# Patient Record
Sex: FEMALE | Race: BLACK OR AFRICAN AMERICAN | NOT HISPANIC OR LATINO | URBAN - METROPOLITAN AREA
[De-identification: names, ages, dates, MRNs, and addresses within clinical notes are randomized per-mention and may not be internally consistent; named-entity substitution may affect disease eponyms.]

---

## 2019-11-07 ENCOUNTER — APPOINTMENT (EMERGENCY)
Dept: RADIOLOGY | Facility: HOSPITAL | Age: 19
End: 2019-11-07

## 2019-11-07 ENCOUNTER — HOSPITAL ENCOUNTER (EMERGENCY)
Facility: HOSPITAL | Age: 19
Discharge: HOME | End: 2019-11-07
Attending: EMERGENCY MEDICINE

## 2019-11-07 VITALS
DIASTOLIC BLOOD PRESSURE: 65 MMHG | OXYGEN SATURATION: 96 % | HEART RATE: 89 BPM | WEIGHT: 115.3 LBS | SYSTOLIC BLOOD PRESSURE: 127 MMHG | TEMPERATURE: 98.6 F | RESPIRATION RATE: 16 BRPM

## 2019-11-07 DIAGNOSIS — J45.901 ASTHMA EXACERBATION: Primary | ICD-10-CM

## 2019-11-07 PROCEDURE — 99283 EMERGENCY DEPT VISIT LOW MDM: CPT

## 2019-11-07 PROCEDURE — 71046 X-RAY EXAM CHEST 2 VIEWS: CPT

## 2019-11-07 PROCEDURE — 94640 AIRWAY INHALATION TREATMENT: CPT

## 2019-11-07 PROCEDURE — 99284 EMERGENCY DEPT VISIT MOD MDM: CPT | Performed by: PHYSICIAN ASSISTANT

## 2019-11-07 RX ORDER — PREDNISONE 20 MG/1
40 TABLET ORAL DAILY
Qty: 8 TABLET | Refills: 0 | Status: SHIPPED | OUTPATIENT
Start: 2019-11-08 | End: 2019-11-12

## 2019-11-07 RX ORDER — ALBUTEROL SULFATE 2.5 MG/3ML
5 SOLUTION RESPIRATORY (INHALATION) ONCE
Status: COMPLETED | OUTPATIENT
Start: 2019-11-07 | End: 2019-11-07

## 2019-11-07 RX ORDER — ALBUTEROL SULFATE 2.5 MG/3ML
5 SOLUTION RESPIRATORY (INHALATION) EVERY 6 HOURS PRN
Qty: 10 VIAL | Refills: 0 | Status: SHIPPED | OUTPATIENT
Start: 2019-11-07

## 2019-11-07 RX ORDER — PREDNISONE 20 MG/1
40 TABLET ORAL ONCE
Status: COMPLETED | OUTPATIENT
Start: 2019-11-07 | End: 2019-11-07

## 2019-11-07 RX ADMIN — IPRATROPIUM BROMIDE 0.5 MG: 0.5 SOLUTION RESPIRATORY (INHALATION) at 11:11

## 2019-11-07 RX ADMIN — ALBUTEROL SULFATE 5 MG: 2.5 SOLUTION RESPIRATORY (INHALATION) at 11:50

## 2019-11-07 RX ADMIN — IPRATROPIUM BROMIDE 0.5 MG: 0.5 SOLUTION RESPIRATORY (INHALATION) at 11:50

## 2019-11-07 RX ADMIN — PREDNISONE 40 MG: 20 TABLET ORAL at 11:50

## 2019-11-07 RX ADMIN — ALBUTEROL SULFATE 5 MG: 2.5 SOLUTION RESPIRATORY (INHALATION) at 11:11

## 2019-11-12 NOTE — ED PROVIDER NOTES
History  Chief Complaint   Patient presents with    URI     Pt c/o wheezing, congestion for the past 2 weeks  Pt denies fevers or recent sick contacts  20year old female PMH asthma presents today complaining of wheezing, SOB, nonproductive cough and congestion for the past 1-2 weeks  Denies fevers, chest pain, abdominal pain, nausea, vomiting, diarrhea  Has not been using her asthma medications because she rarely requires them  Has asthma exacerbations very infrequently and has never been hospitalized for asthma in the past  No recent travel  UTD on immunizations  No sick contacts  None       Past Medical History:   Diagnosis Date    Asthma        History reviewed  No pertinent surgical history  History reviewed  No pertinent family history  I have reviewed and agree with the history as documented  Social History     Tobacco Use    Smoking status: Never Smoker    Smokeless tobacco: Never Used   Substance Use Topics    Alcohol use: Not Currently    Drug use: Never        Review of Systems   HENT: Positive for congestion  Respiratory: Positive for cough, shortness of breath and wheezing  All other systems reviewed and are negative  Physical Exam  Physical Exam   Constitutional: She appears well-developed and well-nourished  No distress  HENT:   Head: Normocephalic and atraumatic  Mouth/Throat: Oropharynx is clear and moist    Eyes: Conjunctivae are normal    Neck: Normal range of motion  Cardiovascular: Normal rate and regular rhythm  Pulmonary/Chest: Effort normal  No stridor  No respiratory distress  She has no wheezes  Duoneb initiated by RN prior to my evaluation  Lungs clear to auscultation during treatment  No retractions or signs of respiratory distress  Abdominal: Soft  She exhibits no distension  Musculoskeletal: Normal range of motion  Neurological: She is alert  Skin: Skin is warm  Capillary refill takes less than 2 seconds  She is not diaphoretic  Psychiatric: She has a normal mood and affect  Vital Signs  ED Triage Vitals [11/07/19 1103]   Temperature Pulse Respirations Blood Pressure SpO2   98 6 °F (37 °C) 89 16 127/65 96 %      Temp Source Heart Rate Source Patient Position - Orthostatic VS BP Location FiO2 (%)   Oral Monitor Sitting Right arm --      Pain Score       No Pain           Vitals:    11/07/19 1103   BP: 127/65   Pulse: 89   Patient Position - Orthostatic VS: Sitting         Visual Acuity      ED Medications  Medications   albuterol inhalation solution 5 mg (5 mg Nebulization Given 11/7/19 1111)   ipratropium (ATROVENT) 0 02 % inhalation solution 0 5 mg (0 5 mg Nebulization Given 11/7/19 1111)   ipratropium (ATROVENT) 0 02 % inhalation solution 0 5 mg (0 5 mg Nebulization Given 11/7/19 1150)   albuterol inhalation solution 5 mg (5 mg Nebulization Given 11/7/19 1150)   predniSONE tablet 40 mg (40 mg Oral Given 11/7/19 1150)       Diagnostic Studies  Results Reviewed     None                 XR chest 2 views   Final Result by Veronica Alexander MD (11/07 1244)      No acute cardiopulmonary disease  Workstation performed: [id number]                    Procedures  Procedures       ED Course                               MDM  Number of Diagnoses or Management Options  Asthma exacerbation:   Diagnosis management comments: Pt reports significant improvement after duoneb  Will give initial dose of prednisone in the ED and have patient continue at home  Advised to establish with a PCP as soon as possible for management of asthma symptoms        Disposition  Final diagnoses:   Asthma exacerbation     Time reflects when diagnosis was documented in both MDM as applicable and the Disposition within this note     Time User Action Codes Description Comment    11/7/2019 12:45 PM Brody Hopkins Add [B46 [id number]] Asthma exacerbation       ED Disposition     ED Disposition Condition Date/Time Comment    Discharge Stable Thu Nov 7, 2019 12:45 PM Hollie Mcguire discharge to home/self care  Follow-up Information     Follow up With Specialties Details Why Contact Info Additional [address]   [address], [address] [address]  [address], [address], [address] [place], [place], [address]          Discharge Medication List as of 11/7/2019 12:47 PM      START taking these medications    Details   albuterol (2 5 mg/3 mL) 0 083 % nebulizer solution Take 2 vials (5 mg total) by nebulization every 6 (six) hours as needed for wheezing or shortness of breath, Starting Thu 11/7/2019, Print      predniSONE 20 mg tablet Take 2 tablets (40 mg total) by mouth daily for 4 days, Starting Fri 11/8/2019, Until Tue 11/12/2019, Print           No discharge procedures on file      ED Provider  Electronically Signed by           Yusuf Reyes PA-C  11/11/19 7380